# Patient Record
Sex: FEMALE | Race: WHITE | Employment: UNEMPLOYED | URBAN - NONMETROPOLITAN AREA
[De-identification: names, ages, dates, MRNs, and addresses within clinical notes are randomized per-mention and may not be internally consistent; named-entity substitution may affect disease eponyms.]

---

## 2017-09-13 ENCOUNTER — TELEPHONE (OUTPATIENT)
Dept: ADMINISTRATIVE | Age: 82
End: 2017-09-13

## 2018-03-10 ENCOUNTER — HOSPITAL ENCOUNTER (INPATIENT)
Age: 83
LOS: 3 days | Discharge: HOME | DRG: 101 | End: 2018-03-13
Attending: INTERNAL MEDICINE | Admitting: INTERNAL MEDICINE
Payer: MEDICARE

## 2018-03-10 PROBLEM — E86.0 DEHYDRATION, MODERATE: Status: ACTIVE | Noted: 2018-03-10

## 2018-03-10 PROBLEM — R56.9 SEIZURES (HCC): Status: ACTIVE | Noted: 2018-03-10

## 2018-03-10 PROBLEM — G93.40 ENCEPHALOPATHY ACUTE: Status: ACTIVE | Noted: 2018-03-10

## 2018-03-10 PROBLEM — G40.011: Status: ACTIVE | Noted: 2018-03-10

## 2018-03-10 PROBLEM — E87.20 LACTIC ACIDEMIA: Status: ACTIVE | Noted: 2018-03-10

## 2018-03-10 LAB
ALBUMIN SERPL-MCNC: 3 G/DL (ref 3.5–5.1)
ALP BLD-CCNC: 64 U/L (ref 38–126)
ALT SERPL-CCNC: 16 U/L (ref 11–66)
ANION GAP SERPL CALCULATED.3IONS-SCNC: 15 MEQ/L (ref 8–16)
AST SERPL-CCNC: 20 U/L (ref 5–40)
BILIRUB SERPL-MCNC: 0.5 MG/DL (ref 0.3–1.2)
BUN BLDV-MCNC: 11 MG/DL (ref 7–22)
CALCIUM SERPL-MCNC: 8.5 MG/DL (ref 8.5–10.5)
CHLORIDE BLD-SCNC: 103 MEQ/L (ref 98–111)
CO2: 22 MEQ/L (ref 23–33)
CREAT SERPL-MCNC: 0.8 MG/DL (ref 0.4–1.2)
GFR SERPL CREATININE-BSD FRML MDRD: 68 ML/MIN/1.73M2
GLUCOSE BLD-MCNC: 106 MG/DL (ref 70–108)
MAGNESIUM: 2 MG/DL (ref 1.6–2.4)
POTASSIUM SERPL-SCNC: 3.7 MEQ/L (ref 3.5–5.2)
PROLACTIN: 12.5 NG/ML
SODIUM BLD-SCNC: 140 MEQ/L (ref 135–145)
TOTAL PROTEIN: 5.6 G/DL (ref 6.1–8)

## 2018-03-10 PROCEDURE — 36415 COLL VENOUS BLD VENIPUNCTURE: CPT

## 2018-03-10 PROCEDURE — 80177 DRUG SCRN QUAN LEVETIRACETAM: CPT

## 2018-03-10 PROCEDURE — 6360000002 HC RX W HCPCS: Performed by: INTERNAL MEDICINE

## 2018-03-10 PROCEDURE — 84146 ASSAY OF PROLACTIN: CPT

## 2018-03-10 PROCEDURE — 99223 1ST HOSP IP/OBS HIGH 75: CPT | Performed by: INTERNAL MEDICINE

## 2018-03-10 PROCEDURE — 83735 ASSAY OF MAGNESIUM: CPT

## 2018-03-10 PROCEDURE — 2580000003 HC RX 258: Performed by: INTERNAL MEDICINE

## 2018-03-10 PROCEDURE — 99222 1ST HOSP IP/OBS MODERATE 55: CPT | Performed by: PSYCHIATRY & NEUROLOGY

## 2018-03-10 PROCEDURE — 80053 COMPREHEN METABOLIC PANEL: CPT

## 2018-03-10 PROCEDURE — 1210000002 HC MED SURG R&B - NEUROSCIENCE

## 2018-03-10 PROCEDURE — 6370000000 HC RX 637 (ALT 250 FOR IP): Performed by: INTERNAL MEDICINE

## 2018-03-10 RX ORDER — LORAZEPAM 2 MG/ML
2 INJECTION INTRAMUSCULAR EVERY 4 HOURS PRN
Status: DISCONTINUED | OUTPATIENT
Start: 2018-03-10 | End: 2018-03-13 | Stop reason: HOSPADM

## 2018-03-10 RX ORDER — DULOXETIN HYDROCHLORIDE 20 MG/1
20 CAPSULE, DELAYED RELEASE ORAL DAILY
COMMUNITY

## 2018-03-10 RX ORDER — PANTOPRAZOLE SODIUM 40 MG/1
40 TABLET, DELAYED RELEASE ORAL 2 TIMES DAILY
COMMUNITY

## 2018-03-10 RX ORDER — HYDROCHLOROTHIAZIDE 12.5 MG/1
12.5 TABLET ORAL DAILY
COMMUNITY

## 2018-03-10 RX ORDER — ONDANSETRON 2 MG/ML
4 INJECTION INTRAMUSCULAR; INTRAVENOUS EVERY 6 HOURS PRN
Status: DISCONTINUED | OUTPATIENT
Start: 2018-03-10 | End: 2018-03-13 | Stop reason: HOSPADM

## 2018-03-10 RX ORDER — FERROUS SULFATE 325(65) MG
325 TABLET ORAL 2 TIMES DAILY WITH MEALS
COMMUNITY

## 2018-03-10 RX ORDER — SODIUM CHLORIDE 9 MG/ML
INJECTION, SOLUTION INTRAVENOUS CONTINUOUS
Status: DISCONTINUED | OUTPATIENT
Start: 2018-03-10 | End: 2018-03-12

## 2018-03-10 RX ORDER — ACETAMINOPHEN AND CODEINE PHOSPHATE 300; 30 MG/1; MG/1
1 TABLET ORAL EVERY 4 HOURS PRN
COMMUNITY

## 2018-03-10 RX ADMIN — LEVETIRACETAM 750 MG: 500 TABLET, FILM COATED ORAL at 20:06

## 2018-03-10 RX ADMIN — LEVETIRACETAM 1000 MG: 100 INJECTION, SOLUTION INTRAVENOUS at 16:10

## 2018-03-10 RX ADMIN — SODIUM CHLORIDE: 9 INJECTION, SOLUTION INTRAVENOUS at 21:38

## 2018-03-10 ASSESSMENT — PAIN SCALES - GENERAL
PAINLEVEL_OUTOF10: 0

## 2018-03-10 NOTE — H&P
Blood Pressure Brother     Cancer Maternal Grandfather        Diet:  DIET GENERAL;    REVIEW OF SYSTEMS:   Pertinent positives as noted in the HPI. All other systems reviewed and negative. PHYSICAL EXAM:    There were no vitals taken for this visit. General appearance:  No apparent distress, appears stated age and cooperative. HEENT:  Normal cephalic, atraumatic without obvious deformity. Pupils equal, round, and reactive to light. Extra ocular muscles intact. Conjunctivae/corneas clear. Neck: Supple, with full range of motion. No jugular venous distention. Trachea midline. Respiratory:  Normal respiratory effort. Clear to auscultation, bilaterally without Rales/Wheezes/Rhonchi. Cardiovascular:  Regular rate and rhythm with normal S1/S2 without murmurs, rubs or gallops. Abdomen: Soft, non-tender, non-distended with normal bowel sounds. Musculoskeletal:  No clubbing, cyanosis or edema bilaterally. Full range of motion without deformity. Skin: Skin color, texture, turgor normal.  No rashes or lesions. Neurologic:  Neurovascularly intact without any focal sensory/motor deficits. Cranial nerves: II-XII intact, grossly non-focal.  Psychiatric:  Alert and oriented, thought content appropriate, normal insight  Capillary Refill: Brisk,< 3 seconds   Peripheral Pulses: +2 palpable, equal bilaterally       Labs:     No results for input(s): WBC, HGB, HCT, PLT in the last 72 hours. No results for input(s): NA, K, CL, CO2, BUN, CREATININE, CALCIUM, PHOS in the last 72 hours. Invalid input(s): MAGNES  No results for input(s): AST, ALT, BILIDIR, BILITOT, ALKPHOS in the last 72 hours. No results for input(s): INR in the last 72 hours. No results for input(s): Davonna Myrna in the last 72 hours.     Urinalysis:    No results found for: Marce Clement, BACTERIA, [facility], BLOODU, [address], [facility]    Radiology:     CXR: I have reviewed the CXR --neg from [facility], [place], operated by [facility]      No orders to display            DVT prophylaxis: [x] Lovenox                                 [x] SCDs                                 [] SQ Heparin                                 [] Encourage ambulation           [] Already on Anticoagulation    Code Status: No Order      PT/OT Eval Status:     Disposition:    [x] Home       [] TCU       [] Rehab       [] Psych       [] SNF       [] [place]       [] Other-    ASSESSMENT:  Presented with a witnessed SZ while on Keppra and was treated with iv ativan in the [facility] ER but was still lethargic. Had lactic acidemia due to the SZ most likley. 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 Load with keppra if not given in the Er,which I was not told but ativan being given  2. EEG  3. Neuro consult  4. Neuro checks  5. dvt prophy  6. Iv hydration        Thank you Martha Page for the opportunity to be involved in this patient's care.     Electronically signed by Neftaly Howard MD on 3/10/2018 at 3:23 PM

## 2018-03-11 ENCOUNTER — APPOINTMENT (OUTPATIENT)
Dept: GENERAL RADIOLOGY | Age: 83
DRG: 101 | End: 2018-03-11
Attending: INTERNAL MEDICINE
Payer: MEDICARE

## 2018-03-11 ENCOUNTER — APPOINTMENT (OUTPATIENT)
Dept: CT IMAGING | Age: 83
DRG: 101 | End: 2018-03-11
Attending: INTERNAL MEDICINE
Payer: MEDICARE

## 2018-03-11 LAB
ANION GAP SERPL CALCULATED.3IONS-SCNC: 15 MEQ/L (ref 8–16)
BACTERIA: ABNORMAL
BILIRUBIN URINE: ABNORMAL
BLOOD, URINE: ABNORMAL
BUN BLDV-MCNC: 11 MG/DL (ref 7–22)
CALCIUM SERPL-MCNC: 8.4 MG/DL (ref 8.5–10.5)
CASTS: ABNORMAL /LPF
CASTS: ABNORMAL /LPF
CHARACTER, URINE: ABNORMAL
CHLORIDE BLD-SCNC: 103 MEQ/L (ref 98–111)
CO2: 23 MEQ/L (ref 23–33)
COLOR: YELLOW
CREAT SERPL-MCNC: 0.7 MG/DL (ref 0.4–1.2)
CRYSTALS: ABNORMAL
EPITHELIAL CELLS, UA: ABNORMAL /HPF
GFR SERPL CREATININE-BSD FRML MDRD: 79 ML/MIN/1.73M2
GLUCOSE BLD-MCNC: 102 MG/DL (ref 70–108)
GLUCOSE, URINE: NEGATIVE MG/DL
HCT VFR BLD CALC: 26.5 % (ref 37–47)
HEMOGLOBIN: 8.8 GM/DL (ref 12–16)
ICTOTEST: NEGATIVE
KETONES, URINE: 15
LEUKOCYTE EST, POC: NEGATIVE
MCH RBC QN AUTO: 29.8 PG (ref 27–31)
MCHC RBC AUTO-ENTMCNC: 33.2 GM/DL (ref 33–37)
MCV RBC AUTO: 89.7 FL (ref 81–99)
MISCELLANEOUS LAB TEST RESULT: ABNORMAL
MYOGLOBIN URINE: POSITIVE
NITRITE, URINE: NEGATIVE
PDW BLD-RTO: 13.2 % (ref 11.5–14.5)
PH UA: 5.5
PLATELET # BLD: 371 THOU/MM3 (ref 130–400)
PMV BLD AUTO: 7.6 FL (ref 7.4–10.4)
POTASSIUM REFLEX MAGNESIUM: 3.6 MEQ/L (ref 3.5–5.2)
PROTEIN UA: 30 MG/DL
RBC # BLD: 2.95 MILL/MM3 (ref 4.2–5.4)
RBC URINE: ABNORMAL /HPF
RENAL EPITHELIAL, UA: ABNORMAL
SODIUM BLD-SCNC: 141 MEQ/L (ref 135–145)
SPECIFIC GRAVITY UA: 1.02 (ref 1–1.03)
TOTAL CK: 146 U/L (ref 30–135)
UROBILINOGEN, URINE: 0.2 EU/DL
WBC # BLD: 8.7 THOU/MM3 (ref 4.8–10.8)
WBC UA: ABNORMAL /HPF
YEAST: ABNORMAL

## 2018-03-11 PROCEDURE — 97166 OT EVAL MOD COMPLEX 45 MIN: CPT

## 2018-03-11 PROCEDURE — 6370000000 HC RX 637 (ALT 250 FOR IP): Performed by: INTERNAL MEDICINE

## 2018-03-11 PROCEDURE — 2580000003 HC RX 258: Performed by: INTERNAL MEDICINE

## 2018-03-11 PROCEDURE — 3209999900 XR COMPARISON OF OUTSIDE FILMS

## 2018-03-11 PROCEDURE — 99232 SBSQ HOSP IP/OBS MODERATE 35: CPT | Performed by: PSYCHIATRY & NEUROLOGY

## 2018-03-11 PROCEDURE — G8987 SELF CARE CURRENT STATUS: HCPCS

## 2018-03-11 PROCEDURE — 36415 COLL VENOUS BLD VENIPUNCTURE: CPT

## 2018-03-11 PROCEDURE — 97116 GAIT TRAINING THERAPY: CPT

## 2018-03-11 PROCEDURE — G8978 MOBILITY CURRENT STATUS: HCPCS

## 2018-03-11 PROCEDURE — 95819 EEG AWAKE AND ASLEEP: CPT

## 2018-03-11 PROCEDURE — G8988 SELF CARE GOAL STATUS: HCPCS

## 2018-03-11 PROCEDURE — 81001 URINALYSIS AUTO W/SCOPE: CPT

## 2018-03-11 PROCEDURE — 85027 COMPLETE CBC AUTOMATED: CPT

## 2018-03-11 PROCEDURE — G8979 MOBILITY GOAL STATUS: HCPCS

## 2018-03-11 PROCEDURE — 80048 BASIC METABOLIC PNL TOTAL CA: CPT

## 2018-03-11 PROCEDURE — 6370000000 HC RX 637 (ALT 250 FOR IP): Performed by: FAMILY MEDICINE

## 2018-03-11 PROCEDURE — 3209999900 CT COMPARISON OF OUTSIDE FILMS

## 2018-03-11 PROCEDURE — 97535 SELF CARE MNGMENT TRAINING: CPT

## 2018-03-11 PROCEDURE — 82550 ASSAY OF CK (CPK): CPT

## 2018-03-11 PROCEDURE — 83874 ASSAY OF MYOGLOBIN: CPT

## 2018-03-11 PROCEDURE — 99233 SBSQ HOSP IP/OBS HIGH 50: CPT | Performed by: INTERNAL MEDICINE

## 2018-03-11 PROCEDURE — 1210000002 HC MED SURG R&B - NEUROSCIENCE

## 2018-03-11 PROCEDURE — 97162 PT EVAL MOD COMPLEX 30 MIN: CPT

## 2018-03-11 RX ORDER — ACETAMINOPHEN 325 MG/1
650 TABLET ORAL EVERY 4 HOURS PRN
Status: DISCONTINUED | OUTPATIENT
Start: 2018-03-11 | End: 2018-03-13 | Stop reason: HOSPADM

## 2018-03-11 RX ORDER — DULOXETIN HYDROCHLORIDE 20 MG/1
20 CAPSULE, DELAYED RELEASE ORAL DAILY
Status: DISCONTINUED | OUTPATIENT
Start: 2018-03-11 | End: 2018-03-13 | Stop reason: HOSPADM

## 2018-03-11 RX ORDER — PANTOPRAZOLE SODIUM 40 MG/1
40 TABLET, DELAYED RELEASE ORAL 2 TIMES DAILY
Status: DISCONTINUED | OUTPATIENT
Start: 2018-03-11 | End: 2018-03-13 | Stop reason: HOSPADM

## 2018-03-11 RX ORDER — LEVETIRACETAM 500 MG/1
1 TABLET ORAL 2 TIMES DAILY
Status: DISCONTINUED | OUTPATIENT
Start: 2018-03-11 | End: 2018-03-11

## 2018-03-11 RX ADMIN — LEVETIRACETAM 750 MG: 500 TABLET, FILM COATED ORAL at 09:47

## 2018-03-11 RX ADMIN — SODIUM CHLORIDE: 9 INJECTION, SOLUTION INTRAVENOUS at 09:45

## 2018-03-11 RX ADMIN — ACETAMINOPHEN 650 MG: 325 TABLET ORAL at 23:14

## 2018-03-11 RX ADMIN — DULOXETINE HYDROCHLORIDE 20 MG: 20 CAPSULE, DELAYED RELEASE ORAL at 09:47

## 2018-03-11 RX ADMIN — PANTOPRAZOLE SODIUM 40 MG: 40 TABLET, DELAYED RELEASE ORAL at 09:47

## 2018-03-11 RX ADMIN — METOPROLOL TARTRATE 25 MG: 25 TABLET ORAL at 20:12

## 2018-03-11 RX ADMIN — LEVETIRACETAM 750 MG: 500 TABLET, FILM COATED ORAL at 20:11

## 2018-03-11 RX ADMIN — PANTOPRAZOLE SODIUM 40 MG: 40 TABLET, DELAYED RELEASE ORAL at 20:12

## 2018-03-11 RX ADMIN — METOPROLOL TARTRATE 25 MG: 25 TABLET ORAL at 09:47

## 2018-03-11 RX ADMIN — ACETAMINOPHEN 650 MG: 325 TABLET ORAL at 05:40

## 2018-03-11 ASSESSMENT — PAIN SCALES - GENERAL
PAINLEVEL_OUTOF10: 0
PAINLEVEL_OUTOF10: 0
PAINLEVEL_OUTOF10: 7
PAINLEVEL_OUTOF10: 3
PAINLEVEL_OUTOF10: 0

## 2018-03-11 NOTE — CONSULTS
Skin - no rashes or lesions  Superficial temporal artery pulses are normal.   Musculoskeletal: Has no hand arthritis, no limitation of ROM in any of the four extremities. no joint tenderness, deformity or swelling. There is no leg edema. The Heart was regular in rate and rhythm. No heart murmur  Chest- Clear  Abdomen: obese, Soft, Intact bowel sounds. Labs:  CMP:  Recent Labs      03/10/18   1532   NA  140   K  3.7   CL  103   CO2  22*   BUN  11   CREATININE  0.8   LABGLOM  68*   GLUCOSE  106   CALCIUM  8.5     Liver: Recent Labs      03/10/18   1532   AST  20   ALT  16   ALKPHOS  64   PROT  5.6*   LABALBU  3.0*   BILITOT  0.5         CT head performed at [facility] on 3/10/2018 was reviewed, showing no acute intracranial process. 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 *    This is an 15-year-old female with history of seizure disorder presenting with breakthrough seizures. The patient is lethargic. CT head performed at [facility] shows no evidence of a bleed, or subacute infarct. I reviewed the study, agree with interpretation. She follows simple commands. She is unable to recall what took place. She is aware that she has seizures and she is on seizure medications. Since arrival to the hospital, no clinical seizures were observed. Case was discussed with the primary service, recommendations are as follows:    Recommendations:                                              1. Continue with Keppra. 2. Seizure precautions. 3. EEG STAT, will call tech in.   4. Correct metabolic derangements. 5. Treat infection. 6. DVT prophylaxis. 7. Physical therapy. 8. Occupational therapy. 9. Case was discussed with primary service. 10. All questions were answered. It was my pleasure to evaluate Desi Pearson today. Please call with questions.       Electronically signed by Rosemary Kat MD on 3/10/2018 at 8:50 PM

## 2018-03-11 NOTE — PROGRESS NOTES
Ketankeithsandee Meredith 60  INPATIENT OCCUPATIONAL THERAPY  [facility] NEUROSCIENCES 4A  EVALUATION    Time:  Time In: 912  Time Out: 936  Timed Code Treatment Minutes: 14 Minutes  Minutes: 24          Date: 3/11/2018  Patient Name: Yoko Allen,   Gender: female      MRN: [id number]  : 1931  (80 y.o.)  Referring Practitioner: Sánchez Roth MD  Diagnosis: seizures  Additional Pertinent Hx: Pt admitted to this hospital d/t recent seizures    Restrictions/Precautions:  Restrictions/Precautions: Fall Risk, Seizure  Required Braces or Orthoses?: No       Past Medical History:   Diagnosis Date    Arthritis     Atrial fibrillation ([address]     Heart murmur     Seizures ([address]     since age 9     Past Surgical History:   Procedure Laterality Date    CHOLECYSTECTOMY      TONSILLECTOMY             Subjective  Chart Reviewed: Yes  Patient assessed for rehabilitation services?: Yes  Family / Caregiver Present: No    Subjective: Pt agreeable to OT session, upon arrival pt seated in chair. Comments: Nurse gave okay to see pt.      General:  Overall Orientation Status: Within Functional Limits    Vision: Impaired    Hearing: Within functional limits         Pain:  Pain Assessment  Patient Currently in Pain: Denies  Pain Assessment: 0-10       Social/Functional:  Lives With: Daughter  Type of Home: House  Home Layout: Two level (7 steps )  Home Access: Stairs to enter without rails  Entrance Stairs - Number of Steps: 1  Home Equipment: Standard walker, Cane (pt reports she primarily used the cane at home)     Bathroom Shower/Tub: Tub/Shower unit (pt reports she sponge bathes)  Bathroom Toilet: Standard  Bathroom Equipment:  (no grab bars in the bathroom)  Bathroom Accessibility: Accessible    Receives Help From: Family  ADL Assistance: Independent  Homemaking Assistance: Independent       Ambulation Assistance: Independent  Transfer Assistance: Independent    Active : Yes  Mode of Transportation: Car  Occupation:

## 2018-03-11 NOTE — PROGRESS NOTES
cards, watch TV  Additional Comments: Patient was using a cane prior to admission, but states that she was going to start using a walker. Objective:  RLE PROM: WNL    RLE AROM: WNL    LLE PROM: WNL    LLE AROM : WNL      Strength RLE: Exception  R Hip Flexion: 4-/5  R Knee Flexion: 4/5  R Knee Extension: 4/5    Strength LLE: Exception  L Hip Flexion: 4-/5  L Knee Flexion: 4/5  L Knee Extension: 4/5         Sensation  Overall Sensation Status: WNL    RLE Tone: Normotonic  LLE Tone: Normotonic       Balance  Posture: Fair  Sitting - Static: Good  Sitting - Dynamic: Good  Standing - Static: Good  Standing - Dynamic: Fair, +  Comments: standing balance with B UE support on the walker    Supine to Sit: Stand by assistance (with HOB raised + increased time needed to complete)  Scooting: Stand by assistance    Transfers  Sit to Stand: Contact guard assistance (verbal cues for hand placement and safety)  Stand to sit: Contact guard assistance       Ambulation 1  Surface: level tile  Device: Standard Walker  Assistance: Contact guard assistance  Quality of Gait: pt demos a decreased luther, decreased B step length and decreased heel to toe gait pattern, slightly flexed posture. Distance: 20'. Ambulation distance limited by fatigue and increased HR, see above  Comments: standard walker used as no rolling walker found on floor. Verbal cues for safety with the walker. Activity Tolerance:  Activity Tolerance: Patient limited by fatigue;Patient limited by endurance    Treatment Initiated: PT eval completed, also completed transfer and gait training with the walker, see above for details. Assessment: Body structures, Functions, Activity limitations: Decreased functional mobility , Decreased strength, Decreased endurance, Decreased balance  Assessment: Patient demonstrates fair tolerance to PT session, limited by fatigue and tachycardia.   She currently presents with the above stated impairments which in turn

## 2018-03-12 LAB
ANION GAP SERPL CALCULATED.3IONS-SCNC: 13 MEQ/L (ref 8–16)
BUN BLDV-MCNC: 11 MG/DL (ref 7–22)
CALCIUM SERPL-MCNC: 8.3 MG/DL (ref 8.5–10.5)
CHLORIDE BLD-SCNC: 104 MEQ/L (ref 98–111)
CO2: 23 MEQ/L (ref 23–33)
CREAT SERPL-MCNC: 0.8 MG/DL (ref 0.4–1.2)
GFR SERPL CREATININE-BSD FRML MDRD: 68 ML/MIN/1.73M2
GLUCOSE BLD-MCNC: 117 MG/DL (ref 70–108)
HCT VFR BLD CALC: 26.3 % (ref 37–47)
HEMOGLOBIN: 8.7 GM/DL (ref 12–16)
MCH RBC QN AUTO: 29.6 PG (ref 27–31)
MCHC RBC AUTO-ENTMCNC: 33 GM/DL (ref 33–37)
MCV RBC AUTO: 89.7 FL (ref 81–99)
PDW BLD-RTO: 13.2 % (ref 11.5–14.5)
PLATELET # BLD: 412 THOU/MM3 (ref 130–400)
PMV BLD AUTO: 7.7 FL (ref 7.4–10.4)
POTASSIUM SERPL-SCNC: 3.5 MEQ/L (ref 3.5–5.2)
RBC # BLD: 2.94 MILL/MM3 (ref 4.2–5.4)
SODIUM BLD-SCNC: 140 MEQ/L (ref 135–145)
WBC # BLD: 9.2 THOU/MM3 (ref 4.8–10.8)

## 2018-03-12 PROCEDURE — 99232 SBSQ HOSP IP/OBS MODERATE 35: CPT | Performed by: PSYCHIATRY & NEUROLOGY

## 2018-03-12 PROCEDURE — 36415 COLL VENOUS BLD VENIPUNCTURE: CPT

## 2018-03-12 PROCEDURE — 97116 GAIT TRAINING THERAPY: CPT

## 2018-03-12 PROCEDURE — 1210000002 HC MED SURG R&B - NEUROSCIENCE

## 2018-03-12 PROCEDURE — 80177 DRUG SCRN QUAN LEVETIRACETAM: CPT

## 2018-03-12 PROCEDURE — 99232 SBSQ HOSP IP/OBS MODERATE 35: CPT | Performed by: INTERNAL MEDICINE

## 2018-03-12 PROCEDURE — 97530 THERAPEUTIC ACTIVITIES: CPT

## 2018-03-12 PROCEDURE — 84238 ASSAY NONENDOCRINE RECEPTOR: CPT

## 2018-03-12 PROCEDURE — 6370000000 HC RX 637 (ALT 250 FOR IP): Performed by: INTERNAL MEDICINE

## 2018-03-12 PROCEDURE — 85027 COMPLETE CBC AUTOMATED: CPT

## 2018-03-12 PROCEDURE — 80048 BASIC METABOLIC PNL TOTAL CA: CPT

## 2018-03-12 PROCEDURE — 97110 THERAPEUTIC EXERCISES: CPT

## 2018-03-12 PROCEDURE — 6360000002 HC RX W HCPCS: Performed by: INTERNAL MEDICINE

## 2018-03-12 PROCEDURE — 2580000003 HC RX 258: Performed by: INTERNAL MEDICINE

## 2018-03-12 RX ORDER — FUROSEMIDE 10 MG/ML
20 INJECTION INTRAMUSCULAR; INTRAVENOUS ONCE
Status: COMPLETED | OUTPATIENT
Start: 2018-03-12 | End: 2018-03-12

## 2018-03-12 RX ORDER — POLYETHYLENE GLYCOL 3350 17 G/17G
17 POWDER, FOR SOLUTION ORAL DAILY
Status: DISCONTINUED | OUTPATIENT
Start: 2018-03-12 | End: 2018-03-13 | Stop reason: HOSPADM

## 2018-03-12 RX ORDER — POTASSIUM CHLORIDE 20 MEQ/1
20 TABLET, EXTENDED RELEASE ORAL 2 TIMES DAILY WITH MEALS
Status: COMPLETED | OUTPATIENT
Start: 2018-03-12 | End: 2018-03-12

## 2018-03-12 RX ADMIN — DULOXETINE HYDROCHLORIDE 20 MG: 20 CAPSULE, DELAYED RELEASE ORAL at 08:27

## 2018-03-12 RX ADMIN — PANTOPRAZOLE SODIUM 40 MG: 40 TABLET, DELAYED RELEASE ORAL at 08:28

## 2018-03-12 RX ADMIN — POLYETHYLENE GLYCOL 3350 17 G: 17 POWDER, FOR SOLUTION ORAL at 15:58

## 2018-03-12 RX ADMIN — POTASSIUM CHLORIDE 20 MEQ: 1500 TABLET, EXTENDED RELEASE ORAL at 19:43

## 2018-03-12 RX ADMIN — POTASSIUM CHLORIDE 20 MEQ: 1500 TABLET, EXTENDED RELEASE ORAL at 08:27

## 2018-03-12 RX ADMIN — LEVETIRACETAM 750 MG: 500 TABLET, FILM COATED ORAL at 08:27

## 2018-03-12 RX ADMIN — RIVAROXABAN 20 MG: 20 TABLET, FILM COATED ORAL at 08:27

## 2018-03-12 RX ADMIN — LEVETIRACETAM 750 MG: 500 TABLET, FILM COATED ORAL at 19:42

## 2018-03-12 RX ADMIN — METOPROLOL TARTRATE 25 MG: 25 TABLET ORAL at 19:42

## 2018-03-12 RX ADMIN — PANTOPRAZOLE SODIUM 40 MG: 40 TABLET, DELAYED RELEASE ORAL at 19:42

## 2018-03-12 RX ADMIN — FUROSEMIDE 20 MG: 10 INJECTION, SOLUTION INTRAMUSCULAR; INTRAVENOUS at 08:27

## 2018-03-12 RX ADMIN — SODIUM CHLORIDE: 9 INJECTION, SOLUTION INTRAVENOUS at 02:01

## 2018-03-12 RX ADMIN — METOPROLOL TARTRATE 25 MG: 25 TABLET ORAL at 08:27

## 2018-03-12 ASSESSMENT — PAIN SCALES - GENERAL
PAINLEVEL_OUTOF10: 0

## 2018-03-12 NOTE — PROCEDURES
[address] [place] [address]                            ELECTROENCEPHALOGRAM REPORT    PATIENT NAME: Oral Sykes                     :        1931  MED REC NO:   [id number]                           ROOM:       0003  ACCOUNT NO:   [de-identified]                           ADMIT DATE: 03/10/2018  PROVIDER:     Carroll Angel. Radha Sen MD    DATE OF EE2018    REFERRING PHYSICIAN:  Carroll Angel. MD Cristina    CLINICAL HISTORY:  An 69-year-old female with seizures. The patient is  transferred from another hospital due to seizure, postictal, recent  flu-like illness, history of seizure since childhood. MEDICATIONS:  Listed are Keppra, duloxetine, metoprolol, and pantoprazole. This is a routine channel EEG performed without sleep deprivation. Hyperventilation was not performed. Photic stimulation was performed. The  patient is described as alert. Background activity is noted to be 9 to 10 Hz in the posterior parietal  area, symmetric, attenuates with eye opening. The patient noted to be  drowsy during parts of recording. Hyperventilation was not performed. Photic stimulation was performed without abnormality. Lead and muscle  artifacts were noted. Fast beta activity was noted during the recording  suggestive of a mild cortical dysfunction such as seen with metabolic  causes, medication effects, or nonspecific encephalopathies. IMPRESSION:  This is a mildly abnormal EEG due to the presence of fast beta  activity suggestive of mild cortical dysfunction such as seen with  metabolic causes, medication effects, or nonspecific encephalopathies. However, there was no evidence of epileptiform activity appreciated.         Stephen Gutierrez MD    D: 2018 22:21:39       T: 2018 22:23:09     [id number]  Job#: [id number]     Doc#: [id number]    CC:

## 2018-03-12 NOTE — PROGRESS NOTES
Date: 3/12/2018  Patient Name: Brian Frias        MRN: [id number]    Account Number: [de-identified]  : 1931  (80 y.o.)  Gender: female   Referring Practitioner: Lauren Zhu MD  Diagnosis: seizures    Brian Frias requires a wheeled walker to complete bathing, toileting, dressing and grooming tasks. These tasks cannot be completed by utilizing a cane secondary to upper body weakness and the need to normalize gait patterns and decrease the risk of falling.   Rusty Giles 118 PTA [id number]

## 2018-03-12 NOTE — PROGRESS NOTES
Esequiel Meredith 60  INPATIENT OCCUPATIONAL THERAPY  [facility] NEUROSCIENCES 4A  DAILY NOTE    Time:  Time In: 1200  Time Out: 1224  Timed Code Treatment Minutes: 24 Minutes  Minutes: 24          Date: 3/12/2018  Patient Name: Yong Maldonado,   Gender: female      Room: [facility]-  MRN: [id number]  : 1931  (80 y.o.)  Referring Practitioner: Levi Fonseca MD  Diagnosis: seizures  Additional Pertinent Hx: Pt admitted to this hospital d/t recent seizures    Restrictions/Precautions:  Restrictions/Precautions: Fall Risk, Seizure  Required Braces or Orthoses?: No       Past Medical History:   Diagnosis Date    Arthritis     Atrial fibrillation ([address]     Heart murmur     Seizures ([address]     since age 9     Past Surgical History:   Procedure Laterality Date    CHOLECYSTECTOMY      TONSILLECTOMY           Subjective     Comments: RN ok'ed treatment.  Pt seated in chair    Pain:  Pain Assessment  Patient Currently in Pain: Denies     Objective     ADL  LE Dressing: Contact guard assistance (doffing and donning socks seated in chair)   Pt declined to complete other ADL's at this time stating that these are already done this am    Transfers  Sit to stand: Contact guard assistance (chair)  Stand to sit: Contact guard assistance (chair)       Balance  Sitting Balance: Supervision (chair)    Standing Balance: Contact guard assistance     Time: 4 minutes 30 seconds  Activity: static standing at board and in preparation for ambulation     Functional Mobility  Functional - Mobility Device: Standard Walker  Activity: Other (into hallway)  Assist Level: Contact guard assistance  Functional Mobility Comments: cues to keep YARON within RW       Activity Tolerance:  Activity Tolerance: Patient limited by fatigue    Assessment:     Performance deficits / Impairments: Decreased functional mobility , Decreased strength, Decreased endurance, Decreased ADL status, Decreased safe awareness, Decreased balance  Prognosis: Good  Discharge Recommendations: Continue to assess pending progress    Patient Education:  Patient Education:  Sharri Pedersen safety    Equipment Recommendations: Other: continue to assess pt;s needs throughout OT POC    Safety:  Safety Devices in place: Yes  Type of devices: All fall risk precautions in place, Left in chair, Gait belt, Call light within reach, Patient at risk for falls    Plan:  Times per week: 3-5x GM  Current Treatment Recommendations: Strengthening, Functional Mobility Training, Endurance Training, Balance Training, Safety Education & Training, Self-Care / ADL, Patient/Caregiver Education & Training    Goals:  Patient goals : To get stronger to return home    Short term goals  Time Frame for Short term goals: 1 week  Short term goal 1: Pt. to tolerate gentle strengthening in BUE and demo 75% accuracy with HEP to improve BUE strength for ease with ADL transfers. Short term goal 2: Pt. to complete UB grooming standing sinkside with 0 cues for safety with supervision to increase level of I with UB ADLs. Short term goal 3: Pt. to complete functional mobility to/from bathroom with supervision with 0 cues for safe AD use. Short term goal 4: Pt. to to complete toile transfer with Sup with 0 cues for hand placement to prevent risk of falls. Short term goal 5: Pt. to complete UB/LB dressing with SBA with use of AE as needed to progress towards PLOF. Long term goals  Time Frame for Long term goals : No LTG d/t ELOS.

## 2018-03-12 NOTE — PLAN OF CARE
Problem: Discharge Planning:  Goal: Patients continuum of care needs are met  Patients continuum of care needs are met  Outcome: Ongoing  Patient's plan is to go to Inpatient Rehab at discharge. See SW note dated 3/12 for details.

## 2018-03-12 NOTE — PROGRESS NOTES
CREATININE  0.8  0.7  0.8   GLUCOSE  106  102  117*     EEG 3/11/2018  IMPRESSION:  This is a mildly abnormal EEG due to the presence of fast beta  activity suggestive of mild cortical dysfunction such as seen with  metabolic causes, medication effects, or nonspecific encephalopathies. However, there was no evidence of epileptiform activity appreciated. 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 *    The patient appears to be doing well, no reported seizures. She is tolerating the Keppra well, her exam is nonfocal.  Case discussed with the primary service, recommendations are as follows :    Plan:    1. Continue Keppra , changed to oral.  2. Correct metabolic derangements. 3. Seizure precautions. 4. DVT prophylaxis. 5. Physical therapy. 6. Occupational therapy. 7. Patient and family questions were answered.   8. Discussed with primary service    Maximo Irwin MD, 3/12/2018 1:02 PM

## 2018-03-12 NOTE — PROGRESS NOTES
intact without any focal sensory/motor deficits. Cranial nerves: II-XII intact, grossly non-focal. Aware of surroundings  Psychiatric: Alert and oriented, thought content appropriate, normal insight         Labs:   Recent Labs      03/11/18   0549  03/12/18   0447   WBC  8.7  9.2   HGB  8.8*  8.7*   HCT  26.5*  26.3*   PLT  371  412*     Recent Labs      03/10/18   1532  03/11/18   0537  03/12/18   0447   NA  140  141  140   K  3.7  3.6  3.5   CL  103  103  104   CO2  22*  23  23   BUN  11  11  11   CREATININE  0.8  0.7  0.8   CALCIUM  8.5  8.4*  8.3*     Recent Labs      03/10/18   1532   AST  20   ALT  16   BILITOT  0.5   ALKPHOS  64     No results for input(s): INR in the last 72 hours.   Recent Labs      03/11/18   0537   CKTOTAL  146*       Urinalysis:      Lab Results   Component Value Date    NITRU NEGATIVE 03/11/2018    WBCUA 5-10 03/11/2018    BACTERIA FEW 03/11/2018    RBCUA 5-10 03/11/2018    BLOODU MODERATE 03/11/2018    SPECGRAV 1.020 03/11/2018       Radiology:  CT COMPARISON OF OUTSIDE FILMS   Final Result      CT COMPARISON OF OUTSIDE FILMS   Final Result      XR COMPARISON OF OUTSIDE FILMS   Final Result      XR COMPARISON OF OUTSIDE FILMS   Final Result      XR COMPARISON OF OUTSIDE FILMS   Final Result      XR COMPARISON OF OUTSIDE FILMS   Final Result      XR COMPARISON OF OUTSIDE FILMS   Final Result      XR COMPARISON OF OUTSIDE FILMS   Final Result          Diet: DIET GENERAL;    DVT prophylaxis: [] Lovenox                                 [] SCDs                                 [] SQ Heparin                                 [] Encourage ambulation           [x] Already on Anticoagulation     Disposition:    [x] Home       [] TCU       [] Rehab       [] Psych       [] SNF       [] [place]       [] Other-    Code Status: No Order    PT/OT Eval Status:  ordered      Assessment/Plan:    Anticipated Discharge in : [address] Problems    Diagnosis Date Noted    Seizures ([facility] [R56.9] 03/10/2018    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 Seizure- on keppra  2. PT/OT  3. Consulted neuro  4.  Hopefully home soon        Electronically signed by Magali Beck MD on 3/12/2018 at 6:53 AM

## 2018-03-12 NOTE — CARE COORDINATION
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 Procedure: EEG  Pertinent abnormal Imaging:CT scans, X-rays done at [facility]  Medications:  Scheduled Meds:   furosemide  20 mg Intravenous Once    potassium chloride  20 mEq Oral BID WC    DULoxetine  20 mg Oral Daily    metoprolol tartrate  25 mg Oral BID    pantoprazole  40 mg Oral BID    rivaroxaban  20 mg Oral Daily with breakfast    levETIRAcetam  750 mg Oral BID     Continuous Infusions:   Pertinent Info/Orders/Treatment Plan:  IV Lasix, Neurology consult, PT/OT, neuro checks, telemetry. Diet: DIET GENERAL;   DVT Prophylaxis: Xarelto stand-alone if home medication  Smoking status:  reports that she has quit smoking. She quit smokeless tobacco use about 65 years ago. Influenza Vaccination Screening Completed: yes  Pneumonia Vaccination Screening Completed: yes  Core measures: vte  PCP: Jass Valdes  Readmission:   no  Risk Score: 17.5     Discharge Planning  Current Residence:  Private Residence  Living Arrangements:  Children   Support Systems:  Children, Family Members, Friends/Neighbors  Current Services PTA:     Potential Assistance Needed:  Home Care  Potential Assistance Purchasing Medications:  No  Does patient want to participate in local refill/ meds to beds program?  No  Type of Home Care Services:  Aide Services, OT, PT, Nursing Services  Patient expects to be discharged to: Inpatient rehab vs [facility] or [facility] for Rehab  Expected Discharge date:  03/12/18  Follow Up Appointment: Best Day/ Time: Monday AM    Discharge Plan: Met with Slick Rosen. She currently lives at home with her daughter.  She has a walker and was to start with [facility] today. Robin Velasco would like to return home at discharge. Possible Rehab vs. TCU vs. Home with [facility]. MARY LOU Russell updated, following.      Evaluation: yes

## 2018-03-13 VITALS
TEMPERATURE: 98.3 F | DIASTOLIC BLOOD PRESSURE: 59 MMHG | RESPIRATION RATE: 16 BRPM | HEART RATE: 102 BPM | HEIGHT: 63 IN | WEIGHT: 182 LBS | OXYGEN SATURATION: 97 % | BODY MASS INDEX: 32.25 KG/M2 | SYSTOLIC BLOOD PRESSURE: 128 MMHG

## 2018-03-13 LAB
ANION GAP SERPL CALCULATED.3IONS-SCNC: 12 MEQ/L (ref 8–16)
BASOPHILS # BLD: 0.8 %
BASOPHILS ABSOLUTE: 0.1 THOU/MM3 (ref 0–0.1)
BUN BLDV-MCNC: 9 MG/DL (ref 7–22)
CALCIUM SERPL-MCNC: 8.4 MG/DL (ref 8.5–10.5)
CHLORIDE BLD-SCNC: 105 MEQ/L (ref 98–111)
CO2: 23 MEQ/L (ref 23–33)
CREAT SERPL-MCNC: 0.7 MG/DL (ref 0.4–1.2)
EOSINOPHIL # BLD: 2.1 %
EOSINOPHILS ABSOLUTE: 0.1 THOU/MM3 (ref 0–0.4)
GFR SERPL CREATININE-BSD FRML MDRD: 79 ML/MIN/1.73M2
GLUCOSE BLD-MCNC: 116 MG/DL (ref 70–108)
HCT VFR BLD CALC: 24.8 % (ref 37–47)
HEMOGLOBIN: 8.5 GM/DL (ref 12–16)
KEPPRA: 24 UG/ML (ref 12–46)
KEPPRA: < 2 UG/ML (ref 12–46)
LYMPHOCYTES # BLD: 11.5 %
LYMPHOCYTES ABSOLUTE: 0.7 THOU/MM3 (ref 1–4.8)
MCH RBC QN AUTO: 30.2 PG (ref 27–31)
MCHC RBC AUTO-ENTMCNC: 34.2 GM/DL (ref 33–37)
MCV RBC AUTO: 88.2 FL (ref 81–99)
MONOCYTES # BLD: 13.3 %
MONOCYTES ABSOLUTE: 0.9 THOU/MM3 (ref 0.4–1.3)
MYOGLOBIN URINE: < 1 MG/L (ref 0–1)
NUCLEATED RED BLOOD CELLS: 0 /100 WBC
PDW BLD-RTO: 13.1 % (ref 11.5–14.5)
PLATELET # BLD: 379 THOU/MM3 (ref 130–400)
PMV BLD AUTO: 7.7 FL (ref 7.4–10.4)
POTASSIUM SERPL-SCNC: 3.9 MEQ/L (ref 3.5–5.2)
RBC # BLD: 2.81 MILL/MM3 (ref 4.2–5.4)
SEG NEUTROPHILS: 72.3 %
SEGMENTED NEUTROPHILS ABSOLUTE COUNT: 4.7 THOU/MM3 (ref 1.8–7.7)
SODIUM BLD-SCNC: 140 MEQ/L (ref 135–145)
SOLUBLE TRANSFERRIN RECEPT: 3.8 MG/L (ref 1.9–4.4)
WBC # BLD: 6.5 THOU/MM3 (ref 4.8–10.8)

## 2018-03-13 PROCEDURE — 99232 SBSQ HOSP IP/OBS MODERATE 35: CPT | Performed by: INTERNAL MEDICINE

## 2018-03-13 PROCEDURE — 80048 BASIC METABOLIC PNL TOTAL CA: CPT

## 2018-03-13 PROCEDURE — 97110 THERAPEUTIC EXERCISES: CPT

## 2018-03-13 PROCEDURE — 51798 US URINE CAPACITY MEASURE: CPT

## 2018-03-13 PROCEDURE — 6370000000 HC RX 637 (ALT 250 FOR IP): Performed by: INTERNAL MEDICINE

## 2018-03-13 PROCEDURE — 97530 THERAPEUTIC ACTIVITIES: CPT

## 2018-03-13 PROCEDURE — 36415 COLL VENOUS BLD VENIPUNCTURE: CPT

## 2018-03-13 PROCEDURE — 85025 COMPLETE CBC W/AUTO DIFF WBC: CPT

## 2018-03-13 RX ORDER — LEVETIRACETAM 500 MG/1
TABLET ORAL
Qty: 180 TABLET | Refills: 3
Start: 2018-03-13

## 2018-03-13 RX ORDER — ASCORBIC ACID 500 MG
500 TABLET ORAL DAILY
Qty: 30 TABLET | Refills: 3 | Status: SHIPPED | OUTPATIENT
Start: 2018-03-13

## 2018-03-13 RX ADMIN — METOPROLOL TARTRATE 25 MG: 25 TABLET ORAL at 09:00

## 2018-03-13 RX ADMIN — DULOXETINE HYDROCHLORIDE 20 MG: 20 CAPSULE, DELAYED RELEASE ORAL at 09:00

## 2018-03-13 RX ADMIN — LEVETIRACETAM 750 MG: 500 TABLET, FILM COATED ORAL at 09:00

## 2018-03-13 RX ADMIN — RIVAROXABAN 20 MG: 20 TABLET, FILM COATED ORAL at 09:01

## 2018-03-13 RX ADMIN — PANTOPRAZOLE SODIUM 40 MG: 40 TABLET, DELAYED RELEASE ORAL at 09:00

## 2018-03-13 RX ADMIN — POLYETHYLENE GLYCOL 3350 17 G: 17 POWDER, FOR SOLUTION ORAL at 09:00

## 2018-03-13 ASSESSMENT — PAIN SCALES - GENERAL
PAINLEVEL_OUTOF10: 0
PAINLEVEL_OUTOF10: 0

## 2018-03-13 NOTE — DISCHARGE SUMMARY
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 Hospital Course:   Bryon Rutherford is a 80 y.o. female admitted to [facility] on 3/10/2018 for a witnessed seizure. The pt has a hx of seizure disorder and these are often provoked by medical illnesses. She had been in another hospital recently with a viral illness. She had had a seizure and was seen at the other hospital and transferred here due to lack of a neurologist.      Her course was benign with no recurrence. She was seen by Neurology and her keppra was adjusted. Level is pending at discharge. She was noted to be anemic which apparently developed during her recent admission. She was placed on iron and will need follow up cbc. .              Exam:     Vitals:  Vitals:    18 1935 18 0459 18 0502 18 0800   BP: 128/66 135/70  (!) 128/59   Pulse: 94 93  102   Resp: 16 15  16   Temp: 98.3 °F (36.8 °C) 99.1 °F (37.3 °C)  98.3 °F (36.8 °C)   TempSrc: Oral Oral  Oral   SpO2: 98% 94%  97%   Weight:   182 lb (82.6 kg)    Height:         Weight: Weight: 182 lb (82.6 kg)     24 hour intake/output:  Intake/Output Summary (Last 24 hours) at 18 1501  Last

## 2018-03-13 NOTE — PROGRESS NOTES
CLINICAL PHARMACY: DISCHARGE MED RECONCILIATION/REVIEW    [facility] ([facility]) Select Patient?: No  Total # of Interventions Recommended: 0   -   Total # Interventions Accepted: 0  Intervention Severity:   - Level 1 Intervention Present?: No   - Level 2 #: 0   - Level 3 #: 0   Time Spent (min): 15    Additional Documentation:    Tiffany Horton, PharmD 3/13/2018 1:18 PM

## 2018-03-13 NOTE — PROGRESS NOTES
Reviewed discharge plan with patient and all questions answered. Patient waiting for daughter to arrive to bring her home.

## 2018-03-13 NOTE — PROGRESS NOTES
[address] 4A - [id number]    Time In:   Time Out: 9323  Timed Code Treatment Minutes: 26 Minutes  Minutes: 26    Date: 3/13/2018  Patient Name: Saima Apodaca,  Gender:  female        MRN: [id number]  : 1931  (80 y.o.)  Referral Date : 18  Referring Practitioner: Arthur Herrera MD  Diagnosis: Seizures  Additional Pertinent Hx: 79 y/o female admitted after a seizure. Past Medical History:   Diagnosis Date    Arthritis     Atrial fibrillation ([address]     Atrial fibrillation with RVR ([address] 2018    GERD (gastroesophageal reflux disease)     Heart murmur     Hypertension     Influenza B 2018    Macular degeneration     Seizures (HCC)     since age 9   Cheryl Cutting Valvular heart disease      Past Surgical History:   Procedure Laterality Date    CATARACT REMOVAL Bilateral     CHOLECYSTECTOMY      TONSILLECTOMY         Restrictions/Precautions:  Fall Risk, Seizure    Subjective:  Subjective: Pt sleeping this morning and woke up with knocking on door pt agreeable to therapy and got up right away. RN approved treatment session this date. Upon arrivial pt in bed and post treatment sessionn transferred to bedside chair.     Pain:   .  Pain Assessment  Pain Level: 0    Social/Functional:  Lives With: Daughter  Type of Home: House  Home Layout: Two level (7 steps )  Home Access: Stairs to enter without rails  Entrance Stairs - Number of Steps: 1  Home Equipment: Standard walker, Cane (pt reports she primarily used the cane at home)     Objective:  Supine to Sit: Stand by assistance  Scooting: Stand by assistance    Transfers  Sit to Stand: Stand by assistance  Stand to sit: Stand by assistance    WB Status: full  Ambulation 1  Surface: level tile  Device: Rolling Walker  Assistance: Stand by assistance  Quality of Gait: slight forward flexed posture requiring verbal cues to maintain upright posture and keep RW in close proximity, decreased B step length  Distance: 75'x1  Comments: pt HR increased into the 150s with ambulation with RN notified and ok of changes. Balance  Comments: Static/dynamic standing balance training at [address] [place] [address] for ~4 minutes at [address] with 1/no UE support with the performance of single/double hand release activities challenging the pt outside [place] with foward/lateral/overhead/inferior reaching and crossing midline to facilitate increased standing balance, proprioception, coordination, and B hip/ankle strategies required to decrease risk of falls and increased improvement with functional mobility. Exercises:  Exercises  Comments: Instructed pt in BLE seated therapeutic exercises 10x each with the performance of heel/toe raises, HS curls, marches, LAQ hip abduction, heel slides, and iso hip adduction requiring min verbal instruction on proper technique for max contraction to facilitate increased BLE strength required for increased improvement with functional transfers and ambulation. Activity Tolerance:  Activity Tolerance: Patient limited by fatigue;Patient limited by endurance    Assessment: Body structures, Functions, Activity limitations: Decreased functional mobility , Decreased strength, Decreased endurance, Decreased balance  Assessment: Pt actively participated in skilled intervention this date. Pt HR increased in th 150s during ambulation and decreased to 103 post ambulation with rest break nursing was ok with the increase. With standing balance activites pt HR increased into the 130s and after ~4 minutes pt stated she needed a reat break. Pt would benefit from continued therapy to increase endurance, balance, and ambulation required to return to prior level of function.    Prognosis: Good  REQUIRES PT FOLLOW UP: Yes  Discharge Recommendations: Continue to assess pending progress, Patient would benefit from continued therapy after discharge    Patient Education:  Patient Education: transfers, ambulation, exercises, standing activities    Equipment Recommendations: Other: continue to assess    Safety:  Type of devices: All fall risk precautions in place, Call light within reach, Gait belt, Left in chair, Nurse notified    Plan:  Times per week: 6X N  Times per day: Daily  Current Treatment Recommendations: Strengthening, Safety Education & Training, Patient/Caregiver Education & Training, Endurance Training, Balance Training, Functional Mobility Training, Equipment Evaluation, Education, & procurement, Transfer Training, Gait Training, Stair training    Goals:  Patient goals : Go home    Short term goals  Time Frame for Short term goals: 2 weeks  Short term goal 1: Patient will transfer supine to <--> sit with mod I in order to get into/out of bed. Short term goal 2: Patient will transfer sit <--> stand with mod I in order to get into/out of chairs. Short term goal 3: Patient will ambulate 150 ft with mod I using least restrictive AD in order to ambulate safely in her home. Short term goal 4: Patient will ascend/descend 7 steps with SBA and 1 hand rail in order to get to her bedroom.     Long term goals  Time Frame for Long term goals : No LTGs due to estimated length of stay    [address] PTA [id number]

## 2023-11-06 ENCOUNTER — TELEPHONE (OUTPATIENT)
Dept: FAMILY MEDICINE CLINIC | Age: 88
End: 2023-11-06

## 2024-04-10 ENCOUNTER — TELEPHONE (OUTPATIENT)
Dept: FAMILY MEDICINE CLINIC | Age: 89
End: 2024-04-10

## 2024-04-10 NOTE — TELEPHONE ENCOUNTER
Ramona from [facility] called and left a message requesting a verbal ok to admit. They are going out today to meet with the family. She had a referral in the past from Ramona but the family was not ready to admit yet. She would like a call back with verbal ok at [phone] opt 2.